# Patient Record
Sex: FEMALE | Race: BLACK OR AFRICAN AMERICAN | NOT HISPANIC OR LATINO | URBAN - METROPOLITAN AREA
[De-identification: names, ages, dates, MRNs, and addresses within clinical notes are randomized per-mention and may not be internally consistent; named-entity substitution may affect disease eponyms.]

---

## 2019-09-20 ENCOUNTER — EMERGENCY (EMERGENCY)
Facility: HOSPITAL | Age: 69
LOS: 1 days | Discharge: LEFT BEFORE DISCHARGE | End: 2019-09-20
Attending: EMERGENCY MEDICINE
Payer: COMMERCIAL

## 2019-09-20 VITALS
OXYGEN SATURATION: 98 % | DIASTOLIC BLOOD PRESSURE: 62 MMHG | HEART RATE: 88 BPM | WEIGHT: 128.09 LBS | SYSTOLIC BLOOD PRESSURE: 116 MMHG | RESPIRATION RATE: 18 BRPM | HEIGHT: 61 IN

## 2019-09-20 PROCEDURE — 93005 ELECTROCARDIOGRAM TRACING: CPT

## 2019-09-20 PROCEDURE — 99284 EMERGENCY DEPT VISIT MOD MDM: CPT

## 2019-09-20 PROCEDURE — 99284 EMERGENCY DEPT VISIT MOD MDM: CPT | Mod: 25

## 2019-09-20 PROCEDURE — 96360 HYDRATION IV INFUSION INIT: CPT

## 2019-09-20 PROCEDURE — 71045 X-RAY EXAM CHEST 1 VIEW: CPT

## 2019-09-20 PROCEDURE — 71045 X-RAY EXAM CHEST 1 VIEW: CPT | Mod: 26

## 2019-09-20 PROCEDURE — 93010 ELECTROCARDIOGRAM REPORT: CPT

## 2019-09-20 RX ORDER — SODIUM CHLORIDE 9 MG/ML
1000 INJECTION INTRAMUSCULAR; INTRAVENOUS; SUBCUTANEOUS ONCE
Refills: 0 | Status: COMPLETED | OUTPATIENT
Start: 2019-09-20 | End: 2019-09-20

## 2019-09-20 RX ADMIN — SODIUM CHLORIDE 1000 MILLILITER(S): 9 INJECTION INTRAMUSCULAR; INTRAVENOUS; SUBCUTANEOUS at 19:00

## 2019-09-20 RX ADMIN — SODIUM CHLORIDE 1000 MILLILITER(S): 9 INJECTION INTRAMUSCULAR; INTRAVENOUS; SUBCUTANEOUS at 18:10

## 2019-09-20 NOTE — ED PROVIDER NOTE - PROGRESS NOTE DETAILS
The patient does not want to stay for further evaluations and wishes to s/o AMA.  The patient understands the risks of s/o AMA including recurrent syncope to death

## 2019-09-20 NOTE — ED ADULT NURSE NOTE - NS ED NURSE ELOPE COMMENTS
pt states to RN "take out my iv I need to go home to my son, I feel better and im not waiting for the bloodwork"

## 2019-09-20 NOTE — ED PROVIDER NOTE - OBJECTIVE STATEMENT
The patient is a 69 year old female [place] with [place] the shopping [place].  The patient felt sick after eating salad and bagel and felt LH, diaphoretic and vomited and passed out.  No CP, No SOB, No abd pain, No back pain, No motor No sensory loss

## 2019-09-20 NOTE — ED ADULT TRIAGE NOTE - CHIEF COMPLAINT QUOTE
patient c/o syncope at [employer]. stated that she vomited. and then passed out. patient denies chest pain,

## 2019-09-20 NOTE — ED ADULT NURSE REASSESSMENT NOTE - NS ED NURSE REASSESS COMMENT FT1
pt requesting to leave ED, states "I have a son I have to get home to", pt refusing labwork, safety maintained

## 2019-09-20 NOTE — ED ADULT NURSE NOTE - OBJECTIVE STATEMENT
pt a&ox3 c/o witnessed syncopal episode at grocery store today, pt did not hit head -bt +nausea and 2x episodes vomiting. Pt states "I ate some potato salad and tuna salad and then I got in the taxi so maybe it was that". NSR on cm, HR 90's. MAEx4. RR even and unlabored. Warm blanket provided, safety maintained, call bell in reach, appears in no apparent distress @ this time

## 2019-09-20 NOTE — ED ADULT NURSE NOTE - NSIMPLEMENTINTERV_GEN_ALL_ED
Implemented All Universal Safety Interventions:  [place] to call system. Call bell, personal items and telephone within reach. Instruct patient to call for assistance. Room bathroom lighting operational. Non-slip footwear when patient is off stretcher. Physically safe environment: no spills, clutter or unnecessary equipment. Stretcher in lowest position, wheels locked, appropriate side rails in place.

## 2019-09-20 NOTE — ED PROVIDER NOTE - PATIENT PORTAL LINK FT
You can access the FollowMyHealth Patient Portal offered by [facility] by registering at the following website: [email or web address]. By joining "[employer]"’s FollowMyHealth portal, you will also be able to view your health information using other applications (apps) compatible with our system.

## 2020-03-16 ENCOUNTER — EMERGENCY (EMERGENCY)
Facility: HOSPITAL | Age: 70
LOS: 1 days | Discharge: HOME | End: 2020-03-16
Attending: EMERGENCY MEDICINE
Payer: COMMERCIAL

## 2020-03-16 VITALS
OXYGEN SATURATION: 100 % | SYSTOLIC BLOOD PRESSURE: 119 MMHG | DIASTOLIC BLOOD PRESSURE: 84 MMHG | TEMPERATURE: 98 F | HEIGHT: 62 IN | WEIGHT: 138.01 LBS | HEART RATE: 96 BPM | RESPIRATION RATE: 16 BRPM

## 2020-03-16 PROBLEM — M19.90 UNSPECIFIED OSTEOARTHRITIS, UNSPECIFIED SITE: Chronic | Status: ACTIVE | Noted: 2019-09-20

## 2020-03-16 PROBLEM — M54.9 DORSALGIA, UNSPECIFIED: Chronic | Status: ACTIVE | Noted: 2019-09-20

## 2020-03-16 LAB
ALBUMIN SERPL ELPH-MCNC: 4.7 G/DL — SIGNIFICANT CHANGE UP (ref 3.3–5.2)
ALP SERPL-CCNC: 170 U/L — HIGH (ref 40–120)
ALT FLD-CCNC: 11 U/L — SIGNIFICANT CHANGE UP
ANION GAP SERPL CALC-SCNC: 15 MMOL/L — SIGNIFICANT CHANGE UP (ref 5–17)
AST SERPL-CCNC: 24 U/L — SIGNIFICANT CHANGE UP
BILIRUB SERPL-MCNC: 0.3 MG/DL — LOW (ref 0.4–2)
BLD GP AB SCN SERPL QL: SIGNIFICANT CHANGE UP
BUN SERPL-MCNC: 24 MG/DL — HIGH (ref 8–20)
CALCIUM SERPL-MCNC: 9.6 MG/DL — SIGNIFICANT CHANGE UP (ref 8.6–10.2)
CHLORIDE SERPL-SCNC: 103 MMOL/L — SIGNIFICANT CHANGE UP (ref 98–107)
CO2 SERPL-SCNC: 24 MMOL/L — SIGNIFICANT CHANGE UP (ref 22–29)
CREAT SERPL-MCNC: 1.05 MG/DL — SIGNIFICANT CHANGE UP (ref 0.5–1.3)
GLUCOSE SERPL-MCNC: 102 MG/DL — HIGH (ref 70–99)
HCT VFR BLD CALC: 37.7 % — SIGNIFICANT CHANGE UP (ref 34.5–45)
HGB BLD-MCNC: 11.9 G/DL — SIGNIFICANT CHANGE UP (ref 11.5–15.5)
MCHC RBC-ENTMCNC: 29.5 PG — SIGNIFICANT CHANGE UP (ref 27–34)
MCHC RBC-ENTMCNC: 31.6 GM/DL — LOW (ref 32–36)
MCV RBC AUTO: 93.5 FL — SIGNIFICANT CHANGE UP (ref 80–100)
PLATELET # BLD AUTO: 372 K/UL — SIGNIFICANT CHANGE UP (ref 150–400)
POTASSIUM SERPL-MCNC: 3.7 MMOL/L — SIGNIFICANT CHANGE UP (ref 3.5–5.3)
POTASSIUM SERPL-SCNC: 3.7 MMOL/L — SIGNIFICANT CHANGE UP (ref 3.5–5.3)
PROT SERPL-MCNC: 7.2 G/DL — SIGNIFICANT CHANGE UP (ref 6.6–8.7)
RBC # BLD: 4.03 M/UL — SIGNIFICANT CHANGE UP (ref 3.8–5.2)
RBC # FLD: 13.6 % — SIGNIFICANT CHANGE UP (ref 10.3–14.5)
SODIUM SERPL-SCNC: 142 MMOL/L — SIGNIFICANT CHANGE UP (ref 135–145)
WBC # BLD: 8.66 K/UL — SIGNIFICANT CHANGE UP (ref 3.8–10.5)
WBC # FLD AUTO: 8.66 K/UL — SIGNIFICANT CHANGE UP (ref 3.8–10.5)

## 2020-03-16 PROCEDURE — 36415 COLL VENOUS BLD VENIPUNCTURE: CPT

## 2020-03-16 PROCEDURE — 80053 COMPREHEN METABOLIC PANEL: CPT

## 2020-03-16 PROCEDURE — 85027 COMPLETE CBC AUTOMATED: CPT

## 2020-03-16 PROCEDURE — 73564 X-RAY EXAM KNEE 4 OR MORE: CPT | Mod: 26,RT

## 2020-03-16 PROCEDURE — 73700 CT LOWER EXTREMITY W/O DYE: CPT | Mod: 26,RT

## 2020-03-16 PROCEDURE — 86901 BLOOD TYPING SEROLOGIC RH(D): CPT

## 2020-03-16 PROCEDURE — 86900 BLOOD TYPING SEROLOGIC ABO: CPT

## 2020-03-16 PROCEDURE — 99285 EMERGENCY DEPT VISIT HI MDM: CPT

## 2020-03-16 PROCEDURE — 86850 RBC ANTIBODY SCREEN: CPT

## 2020-03-16 PROCEDURE — 73564 X-RAY EXAM KNEE 4 OR MORE: CPT

## 2020-03-16 PROCEDURE — 73700 CT LOWER EXTREMITY W/O DYE: CPT

## 2020-03-16 PROCEDURE — 99284 EMERGENCY DEPT VISIT MOD MDM: CPT | Mod: 25

## 2020-03-16 RX ORDER — OXYCODONE AND ACETAMINOPHEN 5; 325 MG/1; MG/1
1 TABLET ORAL ONCE
Refills: 0 | Status: DISCONTINUED | OUTPATIENT
Start: 2020-03-16 | End: 2020-03-16

## 2020-03-16 RX ORDER — RIVAROXABAN 15 MG-20MG
1 KIT ORAL
Qty: 30 | Refills: 0
Start: 2020-03-16 | End: 2020-04-14

## 2020-03-16 RX ADMIN — OXYCODONE AND ACETAMINOPHEN 1 TABLET(S): 5; 325 TABLET ORAL at 14:34

## 2020-03-16 RX ADMIN — OXYCODONE AND ACETAMINOPHEN 1 TABLET(S): 5; 325 TABLET ORAL at 18:10

## 2020-03-16 NOTE — PHYSICAL THERAPY INITIAL EVALUATION ADULT - GENERAL OBSERVATIONS, REHAB EVAL
Pt received seated in bedside w/c, + Knee immobilizer, c/o "ok" pain in right knee, pt agreeable to PT

## 2020-03-16 NOTE — ED ADULT NURSE NOTE - INTERVENTIONS DEFINITIONS
Stretcher in lowest position, wheels locked, appropriate side rails in place/[place] to call system/Call bell, personal items and telephone within reach/Non-slip footwear when patient is off stretcher

## 2020-03-16 NOTE — ED ADULT TRIAGE NOTE - MODE OF ARRIVAL
Patient is a 81y old  Female who presents with a chief complaint of Flu A.      INTERVAL HPI/OVERNIGHT EVENTS: Patient seen and examined at bedside. Patient had fever of 100.4F this morning, received tylenol 650mg PO, repeat temperature 99.2F. Patient states that she is having continued lower back pain, severity 6/10, located at her waistline. Pain is worst with ambulation or movement as well as taking a deep breath. Denies any neurological symptoms including BBI, weakness, numbness, or tingling. Patient states that extra strength tylenol has been helping the pain. Patient endorses some wheezing and nausea, denies any further vomiting. Admits to continued gross hematuria and urinary frequency, denies dysuria, urgency, or incontinence. Denies cough, chest pain, palpitations, abdominal pain, diarrhea.     T(C): 37.3 (20 @ 08:22), Max: 38 (20 @ 07:11)  HR: 76 (20 @ 08:50) (71 - 90)  BP: 164/78 (20 @ 08:22) (129/85 - 164/83)  RR: 17 (20 @ 08:22) (14 - 18)  SpO2: 96% (20 @ 08:50) (96% - 100%)    I&O's Summary    2020 07:01  -  2020 07:00  --------------------------------------------------------  IN: 200 mL / OUT: 0 mL / NET: 200 mL        LABS:                        12.6   4.38  )-----------( 157      ( 2020 06:22 )             40.8         141  |  108  |  12  ----------------------------<  91  4.3   |  27  |  0.85    Ca    7.9<L>      2020 06:22  Phos  1.6         TPro  7.1  /  Alb  3.3  /  TBili  0.5  /  DBili  x   /  AST  20  /  ALT  17  /  AlkPhos  117  01-16    PT/INR - ( 2020 13:04 )   PT: 13.0 sec;   INR: 1.14 ratio         PTT - ( 2020 13:04 )  PTT:26.7 sec  CAPILLARY BLOOD GLUCOSE          Urinalysis Basic - ( 2020 13:04 )    Color: Red / Appearance: Turbid / S.010 / pH: x  Gluc: x / Ketone: Trace  / Bili: Negative / Urobili: Negative   Blood: x / Protein: 100 / Nitrite: Negative   Leuk Esterase: Moderate / RBC: >50 /HPF / WBC 11-25   Sq Epi: x / Non Sq Epi: Occasional / Bacteria: Few        Culture - Urine (collected 2020 21:08)  Source: .Urine Clean Catch (Midstream)  Final Report (2020 19:30):    <10,000 CFU/mL Normal Urogenital Cholé         MEDICATIONS  (STANDING):  albuterol/ipratropium for Nebulization 3 milliLiter(s) Nebulizer every 6 hours  buPROPion . 150 milliGRAM(s) Oral <User Schedule>  buPROPion . 75 milliGRAM(s) Oral at bedtime  calcitonin Nasal 1 Spray(s) Nasal daily  citalopram 40 milliGRAM(s) Oral daily  famotidine    Tablet 40 milliGRAM(s) Oral two times a day  levothyroxine 88 MICROGram(s) Oral daily  lidocaine   Patch 1 Patch Transdermal daily  losartan 25 milliGRAM(s) Oral daily  metoprolol succinate ER 25 milliGRAM(s) Oral daily  oseltamivir 75 milliGRAM(s) Oral two times a day  potassium phosphate / sodium phosphate powder 1 Packet(s) Oral once  simvastatin 40 milliGRAM(s) Oral at bedtime    MEDICATIONS  (PRN):  acetaminophen   Tablet .. 650 milliGRAM(s) Oral every 4 hours PRN Mild Pain (1 - 3)  acetaminophen   Tablet .. 1000 milliGRAM(s) Oral every 6 hours PRN Moderate Pain (4 - 6)  acetaminophen   Tablet .. 650 milliGRAM(s) Oral every 6 hours PRN Temp greater or equal to 38C (100.4F)  ketorolac   Injectable 30 milliGRAM(s) IV Push every 6 hours PRN Severe Pain (7 - 10)      REVIEW OF SYSTEMS:  CONSTITUTIONAL: Admits to fever and fatigue; No weight loss  EYES: No eye pain, visual disturbances, or discharge  ENMT: No difficulty hearing, tinnitus, vertigo; No sinus or throat pain  NECK: No pain   RESPIRATORY: Admits to wheezing; No cough, chills or hemoptysis; No shortness of breath  CARDIOVASCULAR: No chest pain, palpitations, dizziness, or leg swelling  GASTROINTESTINAL: Admits to nausea; No abdominal or epigastric pain. No vomiting, or hematemesis; No diarrhea or constipation; No melena or hematochezia  GENITOURINARY: Admits to gross hematuria and frequency; No dysuria or incontinence  NEUROLOGICAL: No headaches, memory loss, loss of strength, numbness, or tremors  SKIN: No itching, burning, rashes, or lesions   LYMPH NODES: No enlarged glands  MUSCULOSKELETAL: Admits to lower back pain; No joint pain or swelling  PSYCHIATRIC: No depression, anxiety, mood swings, or difficulty sleeping    RADIOLOGY & ADDITIONAL TESTS:    Imaging Personally Reviewed:  [ x ] YES  [ ] NO    Consultant(s) Notes Reviewed:  [ x ] YES  [ ] NO    PHYSICAL EXAM:  GENERAL: Elderly female, sitting comfortably in chair, NAD  HEAD: Atraumatic, Normocephalic  EYES: EOMI, PERRL, conjunctiva and sclera clear  ENMT: No tonsillar erythema, exudates, or enlargement; Moist mucous membranes, Good dentition  NECK: Supple, No JVD  NERVOUS SYSTEM: Alert & Oriented X3, Good concentration; Good motor strength in B/L upper and lower extremities  CHEST/LUNG: Clear to percussion bilaterally; No rales, rhonchi, wheezing, or rubs  HEART: Regular rate and rhythm; No murmurs, rubs, or gallops  ABDOMEN: Soft, Nontender, Nondistended; Bowel sounds present  EXTREMITIES: 2+ Peripheral Pulses, No clubbing, cyanosis, or edema  LYMPH: No lymphadenopathy noted  SKIN: Warm, dry, well-perfused    Care Discussed with Consultants/Other Providers [ ] YES  [ ] NO Patient is a 81y old  Female who presents with a chief complaint of Flu A.      INTERVAL HPI/OVERNIGHT EVENTS: Patient seen and examined at bedside. Patient had fever of 100.4F this morning, received tylenol 650mg PO, repeat temperature 99.2F. Patient states that she is having continued lower back pain, severity 6/10, located at her waistline. Pain is worst with ambulation or movement as well as taking a deep breath. Denies any neurological symptoms including BBI, weakness, numbness, or tingling. Patient states that extra strength tylenol has been helping the pain. Patient endorses some wheezing and nausea, denies any further vomiting. Admits to continued gross hematuria and urinary frequency, denies dysuria, urgency, or incontinence. Denies cough, chest pain, palpitations, abdominal pain, diarrhea.     T(C): 37.3 (20 @ 08:22), Max: 38 (20 @ 07:11)  HR: 76 (20 @ 08:50) (71 - 90)  BP: 164/78 (20 @ 08:22) (129/85 - 164/83)  RR: 17 (20 @ 08:22) (14 - 18)  SpO2: 96% (20 @ 08:50) (96% - 100%)    I&O's Summary    2020 07:01  -  2020 07:00  --------------------------------------------------------  IN: 200 mL / OUT: 0 mL / NET: 200 mL        LABS:                        12.6   4.38  )-----------( 157      ( 2020 06:22 )             40.8         141  |  108  |  12  ----------------------------<  91  4.3   |  27  |  0.85    Ca    7.9<L>      2020 06:22  Phos  1.6         TPro  7.1  /  Alb  3.3  /  TBili  0.5  /  DBili  x   /  AST  20  /  ALT  17  /  AlkPhos  117  01-16    PT/INR - ( 2020 13:04 )   PT: 13.0 sec;   INR: 1.14 ratio         PTT - ( 2020 13:04 )  PTT:26.7 sec  CAPILLARY BLOOD GLUCOSE          Urinalysis Basic - ( 2020 13:04 )    Color: Red / Appearance: Turbid / S.010 / pH: x  Gluc: x / Ketone: Trace  / Bili: Negative / Urobili: Negative   Blood: x / Protein: 100 / Nitrite: Negative   Leuk Esterase: Moderate / RBC: >50 /HPF / WBC 11-25   Sq Epi: x / Non Sq Epi: Occasional / Bacteria: Few        Culture - Urine (collected 2020 21:08)  Source: .Urine Clean Catch (Midstream)  Final Report (2020 19:30):    <10,000 CFU/mL Normal Urogenital Chloé         MEDICATIONS  (STANDING):  albuterol/ipratropium for Nebulization 3 milliLiter(s) Nebulizer every 6 hours  buPROPion . 150 milliGRAM(s) Oral <User Schedule>  buPROPion . 75 milliGRAM(s) Oral at bedtime  calcitonin Nasal 1 Spray(s) Nasal daily  citalopram 40 milliGRAM(s) Oral daily  famotidine    Tablet 40 milliGRAM(s) Oral two times a day  levothyroxine 88 MICROGram(s) Oral daily  lidocaine   Patch 1 Patch Transdermal daily  losartan 25 milliGRAM(s) Oral daily  metoprolol succinate ER 25 milliGRAM(s) Oral daily  oseltamivir 75 milliGRAM(s) Oral two times a day  potassium phosphate / sodium phosphate powder 1 Packet(s) Oral once  simvastatin 40 milliGRAM(s) Oral at bedtime    MEDICATIONS  (PRN):  acetaminophen   Tablet .. 650 milliGRAM(s) Oral every 4 hours PRN Mild Pain (1 - 3)  acetaminophen   Tablet .. 1000 milliGRAM(s) Oral every 6 hours PRN Moderate Pain (4 - 6)  acetaminophen   Tablet .. 650 milliGRAM(s) Oral every 6 hours PRN Temp greater or equal to 38C (100.4F)  ketorolac   Injectable 30 milliGRAM(s) IV Push every 6 hours PRN Severe Pain (7 - 10)      REVIEW OF SYSTEMS:  CONSTITUTIONAL: Admits to fever and fatigue; No weight loss  EYES: No eye pain, visual disturbances, or discharge  ENMT: No difficulty hearing, tinnitus, vertigo; No sinus or throat pain  NECK: No pain   RESPIRATORY: Admits to wheezing; No cough, chills or hemoptysis; No shortness of breath  CARDIOVASCULAR: No chest pain, palpitations, dizziness, or leg swelling  GASTROINTESTINAL: Admits to nausea; No abdominal or epigastric pain. No vomiting, or hematemesis; No diarrhea or constipation; No melena or hematochezia  GENITOURINARY: Admits to gross hematuria and frequency; No dysuria or incontinence  NEUROLOGICAL: No headaches, memory loss, loss of strength, numbness, or tremors  SKIN: No itching, burning, rashes, or lesions   LYMPH NODES: No enlarged glands  MUSCULOSKELETAL: Admits to lower back pain; No joint pain or swelling  PSYCHIATRIC: No depression, anxiety, mood swings, or difficulty sleeping    RADIOLOGY & ADDITIONAL TESTS:    Imaging Personally Reviewed:  [ x ] YES  [ ] NO    Consultant(s) Notes Reviewed:  [ x ] YES  [ ] NO    PHYSICAL EXAM:  GENERAL: Elderly female, sitting comfortably in chair, NAD  HEAD: Atraumatic, Normocephalic  EYES: Conjunctiva and sclera clear  ENMT: Moist mucous membranes  NECK: Supple  NERVOUS SYSTEM: Alert & Oriented X3, 4/5 motor strength B/L LEs; Sensation intact to light touch; Babinski negative B/L  CHEST/LUNG: Mild expiratory wheezing bilaterally; Moving air well throughout all lung fields; No rales, rhonchi, or rubs  HEART: Regular rate and rhythm; No murmurs, rubs, or gallops  ABDOMEN: Soft, Nontender, Nondistended; Bowel sounds present x4  EXTREMITIES: 2+ Peripheral Pulses B/L LEs, No clubbing, cyanosis, or edema  LYMPH: No lymphadenopathy noted  SKIN: Warm, dry, well-perfused    Care Discussed with Consultants/Other Providers [ x ] YES  [ ] NO EMS Ambulance Patient is a 81y old  Female who presents with a chief complaint of Flu A.      INTERVAL HPI/OVERNIGHT EVENTS: Patient seen and examined at bedside. Patient had fever of 100.4F this morning, received tylenol 650mg PO, repeat temperature 99.2F. Patient states that she is having continued lower back pain, severity 6/10, located at her waistline. Pain is worst with ambulation or movement as well as taking a deep breath. Denies any neurological symptoms including BBI, weakness, numbness, or tingling. Patient states that extra strength tylenol has been helping the pain. Patient endorses some wheezing and nausea, denies any further vomiting. Admits to continued gross hematuria and urinary frequency, denies dysuria, urgency, or incontinence. Denies cough, chest pain, palpitations, abdominal pain, diarrhea.     T(C): 37.3 (20 @ 08:22), Max: 38 (20 @ 07:11)  HR: 76 (20 @ 08:50) (71 - 90)  BP: 164/78 (20 @ 08:22) (129/85 - 164/83)  RR: 17 (20 @ 08:22) (14 - 18)  SpO2: 96% (20 @ 08:50) (96% - 100%)    I&O's Summary    2020 07:01  -  2020 07:00  --------------------------------------------------------  IN: 200 mL / OUT: 0 mL / NET: 200 mL        LABS:                        12.6   4.38  )-----------( 157      ( 2020 06:22 )             40.8         141  |  108  |  12  ----------------------------<  91  4.3   |  27  |  0.85    Ca    7.9<L>      2020 06:22  Phos  1.6         TPro  7.1  /  Alb  3.3  /  TBili  0.5  /  DBili  x   /  AST  20  /  ALT  17  /  AlkPhos  117  01-16    PT/INR - ( 2020 13:04 )   PT: 13.0 sec;   INR: 1.14 ratio    PTT - ( 2020 13:04 )  PTT:26.7 sec    Urinalysis Basic - ( 2020 13:04 )  Color: Red / Appearance: Turbid / S.010 / pH: x  Gluc: x / Ketone: Trace  / Bili: Negative / Urobili: Negative   Blood: x / Protein: 100 / Nitrite: Negative   Leuk Esterase: Moderate / RBC: >50 /HPF / WBC 11-25   Sq Epi: x / Non Sq Epi: Occasional / Bacteria: Few    Culture - Urine (collected 2020 21:08)  Source: .Urine Clean Catch (Midstream)  Final Report (2020 19:30):    <10,000 CFU/mL Normal Urogenital Chloé       MEDICATIONS  (STANDING):  albuterol/ipratropium for Nebulization 3 milliLiter(s) Nebulizer every 6 hours  buPROPion . 150 milliGRAM(s) Oral <User Schedule>  buPROPion . 75 milliGRAM(s) Oral at bedtime  calcitonin Nasal 1 Spray(s) Nasal daily  citalopram 40 milliGRAM(s) Oral daily  famotidine    Tablet 40 milliGRAM(s) Oral two times a day  levothyroxine 88 MICROGram(s) Oral daily  lidocaine   Patch 1 Patch Transdermal daily  losartan 25 milliGRAM(s) Oral daily  metoprolol succinate ER 25 milliGRAM(s) Oral daily  oseltamivir 75 milliGRAM(s) Oral two times a day  potassium phosphate / sodium phosphate powder 1 Packet(s) Oral once  simvastatin 40 milliGRAM(s) Oral at bedtime    MEDICATIONS  (PRN):  acetaminophen   Tablet .. 650 milliGRAM(s) Oral every 4 hours PRN Mild Pain (1 - 3)  acetaminophen   Tablet .. 1000 milliGRAM(s) Oral every 6 hours PRN Moderate Pain (4 - 6)  acetaminophen   Tablet .. 650 milliGRAM(s) Oral every 6 hours PRN Temp greater or equal to 38C (100.4F)  ketorolac   Injectable 30 milliGRAM(s) IV Push every 6 hours PRN Severe Pain (7 - 10)      REVIEW OF SYSTEMS:  CONSTITUTIONAL: Admits to fever and fatigue; No weight loss  EYES: No eye pain, visual disturbances, or discharge  ENMT: No difficulty hearing, tinnitus, vertigo; No sinus or throat pain  NECK: No pain   RESPIRATORY: Admits to wheezing; No cough, chills or hemoptysis; No shortness of breath  CARDIOVASCULAR: No chest pain, palpitations, dizziness, or leg swelling  GASTROINTESTINAL: Admits to nausea; No abdominal or epigastric pain. No vomiting, or hematemesis; No diarrhea or constipation; No melena or hematochezia  GENITOURINARY: Admits to gross hematuria and frequency; No dysuria or incontinence  NEUROLOGICAL: No headaches, memory loss, loss of strength, numbness, or tremors  SKIN: No itching, burning, rashes, or lesions   LYMPH NODES: No enlarged glands  MUSCULOSKELETAL: Admits to lower back pain; No joint pain or swelling  PSYCHIATRIC: No depression, anxiety, mood swings, or difficulty sleeping    RADIOLOGY & ADDITIONAL TESTS:    Imaging Personally Reviewed:  [ x ] YES  [ ] NO    Consultant(s) Notes Reviewed:  [ x ] YES  [ ] NO    PHYSICAL EXAM:  GENERAL: Elderly female, sitting comfortably in chair, NAD  HEAD: Atraumatic, Normocephalic  EYES: Conjunctiva and sclera clear  ENMT: Moist mucous membranes  NECK: Supple  NERVOUS SYSTEM: Alert & Oriented X3, 4/5 motor strength B/L LEs; Sensation intact to light touch; Babinski negative B/L  CHEST/LUNG: Mild expiratory wheezing bilaterally; Moving air well throughout all lung fields; No rales, rhonchi, or rubs  HEART: Regular rate and rhythm; No murmurs, rubs, or gallops  ABDOMEN: Soft, Nontender, Nondistended; Bowel sounds present x4  EXTREMITIES: 2+ Peripheral Pulses B/L LEs, No clubbing, cyanosis, or edema  LYMPH: No lymphadenopathy noted  SKIN: Warm, dry, well-perfused    Care Discussed with Consultants/Other Providers [ x ] YES  [ ] NO

## 2020-03-16 NOTE — ED ADULT NURSE NOTE - OBJECTIVE STATEMENT
pt presents c/o slip and fall onto R knee at home last night. increasing pain and swelling to R knee noted since last night.

## 2020-03-16 NOTE — PROVIDER CONTACT NOTE (OTHER) - ACTION/TREATMENT ORDERED:
Pt. expressed her concern that she has no one to pick her up, an ambulance is arranged for her to her house

## 2020-03-16 NOTE — ED PROVIDER NOTE - PATIENT PORTAL LINK FT
You can access the FollowMyHealth Patient Portal offered by [facility] by registering at the following website: [email or web address]. By joining [employer]’s FollowMyHealth portal, you will also be able to view your health information using other applications (apps) compatible with our system.

## 2020-03-16 NOTE — PROVIDER CONTACT NOTE (OTHER) - RECOMMENDATIONS
Pt. is d/c'ed with a 3 in 1 Commode provided at bedside by [facility] and a Wheel chair to be delivered to her house tomorrow morning  by the same.

## 2020-03-16 NOTE — PROVIDER CONTACT NOTE (OTHER) - ASSESSMENT
Pt requires assist for transfers and is only able to perform very short distance ambulation with RW, maintaining NWB on RLE.

## 2020-03-16 NOTE — ED PROVIDER NOTE - PROGRESS NOTE DETAILS
Pt with + tibia plateau fx- Pt seen by PT and Ortho- Pt can not use a cane or rolling walker. Pt needs wheelchair for ADLs in home. Pt also, can not self propel a standard wheelchair and would benefit from a lightweight wheelchair Pt with + tibia plateau fx- Pt seen by PT and Ortho- Ortho ok with dc home in knee immobilizer and dc home with pain control and DVT prevention for 1 month. Dr Nuñez to follow in office. Pt seen by PT- Pt can not use a cane or rolling walker. Pt needs wheelchair for ADLs in home. Pt also, can not self propel a standard wheelchair and would benefit from a lightweight wheelchair

## 2020-03-16 NOTE — CONSULT NOTE ADULT - SUBJECTIVE AND OBJECTIVE BOX
Pt Name: KYMBERLY GELLER    MRN: [id number]      Patient is a 69y Female presenting to the emergency department with a chief complaint of right knee pain s/p slip and fall at home today. Pt says she was cleaning her floor and slipped in the water. Her son helped her up and she had knee pain and swelling and came to the ER. No hip or ankle pain. No numbness or tingling in extremity. No other complaints.    HEALTH ISSUES - PROBLEM Dx:  Right tibial plateau fx      REVIEW OF SYSTEMS    General:	No fevers/chills    Skin/Breast:  No rash  	  Respiratory and Thorax: No CP  	  Cardiovascular:	No SOB    Gastrointestinal:	 No nausea    Musculoskeletal:	 See HPI    Neurological:	See HPI    ROS is otherwise negative.    PAST MEDICAL & SURGICAL HISTORY:  PAST MEDICAL & SURGICAL HISTORY:  Osteoarthritis  Chronic back pain: lumbar spine  S/P laminectomy  S/P  section  S/P hysterectomy: Fibroids  S/P cholecystectomy      Allergies: Lorabid (Hives)  Shellfish (Hives)      Medications:     FAMILY HISTORY:  No pertinent family history in first degree relatives  : non-contributory    Social History:     Ambulation: Walking independently [X ] With Cane [ ] With Walker [ ]  Bedbound [ ]                           11.9   8.66  )-----------( 372      ( 16 Mar 2020 14:46 )             37.7     03-16    142  |  103  |  24.0<H>  ----------------------------<  102<H>  3.7   |  24.0  |  1.05    Ca    9.6      16 Mar 2020 14:46    TPro  7.2  /  Alb  4.7  /  TBili  0.3<L>  /  DBili  x   /  AST  24  /  ALT  11  /  AlkPhos  170<H>  03-16      PHYSICAL EXAM:    Vital Signs Last 24 Hrs  T(C): 36.9 (16 Mar 2020 13:29), Max: 36.9 (16 Mar 2020 13:29)  T(F): 98.4 (16 Mar 2020 13:29), Max: 98.4 (16 Mar 2020 13:29)  HR: 96 (16 Mar 2020 13:29) (96 - 96)  BP: 119/84 (16 Mar 2020 13:29) (119/84 - 119/84)  BP(mean): --  RR: 16 (16 Mar 2020 13:29) (16 - 16)  SpO2: 100% (16 Mar 2020 13:29) (100% - 100%)  Daily Height in cm: 157.48 (16 Mar 2020 13:29)    Daily     Appearance: Alert, responsive, in no acute distress.    Neurological: Sensation is grossly intact to light touch. 5/5 motor function of all extremities. No focal deficits or weaknesses found.    Skin: no rash on visible skin. Skin is clean, dry and intact. No bleeding. No abrasions. No ulcerations.    Vascular: 2+ distal pulses. Cap refill < 2 sec. No extremity ulcerations. No cyanosis.    Musculoskeletal:       Right Lower Extremity: +swelling knee. Skin intact. +TTP med and lat joint line. Able to SLR. Calf soft, NT. +ROM hip. +DF/PF. +EHL/FHL. Sensation intact. DP intact.    Imaging Studies:  < from: CT Knee No Cont, Right (20 @ 15:27) >     EXAM:  CT KNEE ONLY RT                          PROCEDURE DATE:  2020          INTERPRETATION:    CT of the right knee without contrast.    CLINICAL INFORMATION: Right knee pain status post fall.  TECHNIQUE: Axial CT images were obtained of the right knee with coronal and sagittal reconstructions. No contrast was administered.     FINDINGS:    There is a comminuted depressed fracture of the lateral tibial plateau with up to 4 mm depression at the posterior articular surface. Fracture line extends to involve the base of the lateral tibial spine. Nondisplaced fracture involves the medial tibial plateau adjacent to the medial tibial spine. Apparent subtle cortical irregularity in the distal femoral diaphysis seen on sagittal and coronalreformatted images is most likely artifactual, likely related to motion artifact. There is a large knee joint effusion with likely lipohemarthrosis. Scattered vascular calcifications. Mild anterior subcutaneous stranding. No radiopaque foreign bodiesare seen. There is chondrocalcinosis of the menisci and articular cartilage. Arthritic changes in the patellofemoral compartment with cortical irregularities of the patellar articular surface and possible small cystic or erosive changes.    IMPRESSION:    Comminuted depressed fracture of the lateral tibial plateau. Nondisplaced fracture involving the medial tibial plateau adjacent to the medial tibial spine.    Apparent subtle cortical irregularity in the distal femoral diaphysis seen on sagittaland coronal reformatted images is most likely artifactual, likely related to motion artifact. Correlate clinically.    Large knee joint effusion with likely lipohemarthrosis.    Chondrocalcinosis of the menisci. Arthritic changes in the patellofemoral compartment. Correlate clinically for possible CPPD.    < end of copied text >      A/P:  Pt is a  69y Female with right medial and lateral tibial plateau fx    PLAN:   -right knee wrapped with webril and ace  -Knee immobilizer RLE  -NWB RLE  -Pain control  -Elevate RLE  -Evaluated by PT  -Pt would like to go home to take care of her son.  arranging home PT and wheelchair.  -F/U with Dr Nuñez in office this week, call for appointment  D/W Dr Nuñez Pt Name: KYMBERLY GELLER    MRN: [id number]      Patient is a 69y Female presenting to the emergency department with a chief complaint of right knee pain s/p slip and fall at home today. Pt says she was cleaning her floor and slipped in the water. Her son helped her up and she had knee pain and swelling and came to the ER. No hip or ankle pain. No numbness or tingling in extremity. No other complaints.    HEALTH ISSUES - PROBLEM Dx:  Right tibial plateau fx      REVIEW OF SYSTEMS    General:	No fevers/chills    Skin/Breast:  No rash  	  Respiratory and Thorax: No CP  	  Cardiovascular:	No SOB    Gastrointestinal:	 No nausea    Musculoskeletal:	 See HPI    Neurological:	See HPI    ROS is otherwise negative.    PAST MEDICAL & SURGICAL HISTORY:  PAST MEDICAL & SURGICAL HISTORY:  Osteoarthritis  Chronic back pain: lumbar spine  S/P laminectomy  S/P  section  S/P hysterectomy: Fibroids  S/P cholecystectomy      Allergies: Lorabid (Hives)  Shellfish (Hives)      Medications:     FAMILY HISTORY:  No pertinent family history in first degree relatives  : non-contributory    Social History:     Ambulation: Walking independently [X ] With Cane [ ] With Walker [ ]  Bedbound [ ]                           11.9   8.66  )-----------( 372      ( 16 Mar 2020 14:46 )             37.7     03-16    142  |  103  |  24.0<H>  ----------------------------<  102<H>  3.7   |  24.0  |  1.05    Ca    9.6      16 Mar 2020 14:46    TPro  7.2  /  Alb  4.7  /  TBili  0.3<L>  /  DBili  x   /  AST  24  /  ALT  11  /  AlkPhos  170<H>  03-16      PHYSICAL EXAM:    Vital Signs Last 24 Hrs  T(C): 36.9 (16 Mar 2020 13:29), Max: 36.9 (16 Mar 2020 13:29)  T(F): 98.4 (16 Mar 2020 13:29), Max: 98.4 (16 Mar 2020 13:29)  HR: 96 (16 Mar 2020 13:29) (96 - 96)  BP: 119/84 (16 Mar 2020 13:29) (119/84 - 119/84)  BP(mean): --  RR: 16 (16 Mar 2020 13:29) (16 - 16)  SpO2: 100% (16 Mar 2020 13:29) (100% - 100%)  Daily Height in cm: 157.48 (16 Mar 2020 13:29)    Daily     Appearance: Alert, responsive, in no acute distress.    Neurological: Sensation is grossly intact to light touch. 5/5 motor function of all extremities. No focal deficits or weaknesses found.    Skin: no rash on visible skin. Skin is clean, dry and intact. No bleeding. No abrasions. No ulcerations.    Vascular: 2+ distal pulses. Cap refill < 2 sec. No extremity ulcerations. No cyanosis.    Musculoskeletal:       Right Lower Extremity: +swelling knee. Skin intact. +TTP med and lat joint line. Able to SLR. Calf soft, NT. +ROM hip. +DF/PF. +EHL/FHL. Sensation intact. DP intact.    Imaging Studies:  < from: CT Knee No Cont, Right (20 @ 15:27) >     EXAM:  CT KNEE ONLY RT                          PROCEDURE DATE:  2020          INTERPRETATION:    CT of the right knee without contrast.    CLINICAL INFORMATION: Right knee pain status post fall.  TECHNIQUE: Axial CT images were obtained of the right knee with coronal and sagittal reconstructions. No contrast was administered.     FINDINGS:    There is a comminuted depressed fracture of the lateral tibial plateau with up to 4 mm depression at the posterior articular surface. Fracture line extends to involve the base of the lateral tibial spine. Nondisplaced fracture involves the medial tibial plateau adjacent to the medial tibial spine. Apparent subtle cortical irregularity in the distal femoral diaphysis seen on sagittal and coronalreformatted images is most likely artifactual, likely related to motion artifact. There is a large knee joint effusion with likely lipohemarthrosis. Scattered vascular calcifications. Mild anterior subcutaneous stranding. No radiopaque foreign bodiesare seen. There is chondrocalcinosis of the menisci and articular cartilage. Arthritic changes in the patellofemoral compartment with cortical irregularities of the patellar articular surface and possible small cystic or erosive changes.    IMPRESSION:    Comminuted depressed fracture of the lateral tibial plateau. Nondisplaced fracture involving the medial tibial plateau adjacent to the medial tibial spine.    Apparent subtle cortical irregularity in the distal femoral diaphysis seen on sagittaland coronal reformatted images is most likely artifactual, likely related to motion artifact. Correlate clinically.    Large knee joint effusion with likely lipohemarthrosis.    Chondrocalcinosis of the menisci. Arthritic changes in the patellofemoral compartment. Correlate clinically for possible CPPD.    < end of copied text >      A/P:  Pt is a  69y Female with right medial and lateral tibial plateau fx    PLAN:   -right knee wrapped with webril and ace  -Knee immobilizer RLE  -NWB RLE  -Pain control  -DVT prophylaxis x 4 weeks  -Elevate RLE  -Evaluated by PT  -Pt would like to go home to take care of her son.  arranging home PT and wheelchair.  -F/U with Dr Nuñez in office this week, call for appointment  D/W Dr Nuñez Pt Name: KYMBERLY GELLER    MRN: [id number]      Patient is a 69y Female presenting to the emergency department with a chief complaint of right knee pain s/p slip and fall at home today. Pt says she was cleaning her floor and slipped in the water. Her son helped her up and she had knee pain and swelling and came to the ER. No hip or ankle pain. No numbness or tingling in extremity. No other complaints.    HEALTH ISSUES - PROBLEM Dx:  Right tibial plateau fx      REVIEW OF SYSTEMS    General:	No fevers/chills    Skin/Breast:  No rash  	  Respiratory and Thorax: No CP  	  Cardiovascular:	No SOB    Gastrointestinal:	 No nausea    Musculoskeletal:	 See HPI    Neurological:	See HPI    ROS is otherwise negative.    PAST MEDICAL & SURGICAL HISTORY:  PAST MEDICAL & SURGICAL HISTORY:  Osteoarthritis  Chronic back pain: lumbar spine  S/P laminectomy  S/P  section  S/P hysterectomy: Fibroids  S/P cholecystectomy      Allergies: Lorabid (Hives)  Shellfish (Hives)      Medications:     FAMILY HISTORY:  No pertinent family history in first degree relatives  : non-contributory    Social History:     Ambulation: Walking independently [X ] With Cane [ ] With Walker [ ]  Bedbound [ ]                           11.9   8.66  )-----------( 372      ( 16 Mar 2020 14:46 )             37.7     03-16    142  |  103  |  24.0<H>  ----------------------------<  102<H>  3.7   |  24.0  |  1.05    Ca    9.6      16 Mar 2020 14:46    TPro  7.2  /  Alb  4.7  /  TBili  0.3<L>  /  DBili  x   /  AST  24  /  ALT  11  /  AlkPhos  170<H>  03-16      PHYSICAL EXAM:    Vital Signs Last 24 Hrs  T(C): 36.9 (16 Mar 2020 13:29), Max: 36.9 (16 Mar 2020 13:29)  T(F): 98.4 (16 Mar 2020 13:29), Max: 98.4 (16 Mar 2020 13:29)  HR: 96 (16 Mar 2020 13:29) (96 - 96)  BP: 119/84 (16 Mar 2020 13:29) (119/84 - 119/84)  BP(mean): --  RR: 16 (16 Mar 2020 13:29) (16 - 16)  SpO2: 100% (16 Mar 2020 13:29) (100% - 100%)  Daily Height in cm: 157.48 (16 Mar 2020 13:29)    Daily     Appearance: Alert, responsive, in no acute distress.    Neurological: Sensation is grossly intact to light touch. 5/5 motor function of all extremities. No focal deficits or weaknesses found.    Skin: no rash on visible skin. Skin is clean, dry and intact. No bleeding. No abrasions. No ulcerations.    Vascular: 2+ distal pulses. Cap refill < 2 sec. No extremity ulcerations. No cyanosis.    Musculoskeletal:       Right Lower Extremity: +swelling knee. Skin intact. +TTP med and lat joint line. Able to SLR. Calf soft, NT. +ROM hip. +DF/PF. +EHL/FHL. Sensation intact. DP intact.    Imaging Studies:  < from: CT Knee No Cont, Right (20 @ 15:27) >     EXAM:  CT KNEE ONLY RT                          PROCEDURE DATE:  2020          INTERPRETATION:    CT of the right knee without contrast.    CLINICAL INFORMATION: Right knee pain status post fall.  TECHNIQUE: Axial CT images were obtained of the right knee with coronal and sagittal reconstructions. No contrast was administered.     FINDINGS:    There is a comminuted depressed fracture of the lateral tibial plateau with up to 4 mm depression at the posterior articular surface. Fracture line extends to involve the base of the lateral tibial spine. Nondisplaced fracture involves the medial tibial plateau adjacent to the medial tibial spine. Apparent subtle cortical irregularity in the distal femoral diaphysis seen on sagittal and coronalreformatted images is most likely artifactual, likely related to motion artifact. There is a large knee joint effusion with likely lipohemarthrosis. Scattered vascular calcifications. Mild anterior subcutaneous stranding. No radiopaque foreign bodiesare seen. There is chondrocalcinosis of the menisci and articular cartilage. Arthritic changes in the patellofemoral compartment with cortical irregularities of the patellar articular surface and possible small cystic or erosive changes.    IMPRESSION:    Comminuted depressed fracture of the lateral tibial plateau. Nondisplaced fracture involving the medial tibial plateau adjacent to the medial tibial spine.    Apparent subtle cortical irregularity in the distal femoral diaphysis seen on sagittaland coronal reformatted images is most likely artifactual, likely related to motion artifact. Correlate clinically.    Large knee joint effusion with likely lipohemarthrosis.    Chondrocalcinosis of the menisci. Arthritic changes in the patellofemoral compartment. Correlate clinically for possible CPPD.    < end of copied text >      A/P:  Pt is a  69y Female with right medial and lateral tibial plateau fx    PLAN:   -right knee wrapped with webril and ace  -Knee immobilizer RLE  -NWB RLE  -Pain control  -DVT prophylaxis x 4 weeks  -Elevate RLE  -Evaluated by PT  -Pt would like to go home to take care of her son.  arranging home PT and wheelchair.  -F/U with Dr Nuñez/Dr Valdivia in office this week, call for appointment  D/W Dr Nuñez

## 2020-03-16 NOTE — PHYSICAL THERAPY INITIAL EVALUATION ADULT - ADDITIONAL COMMENTS
Pt lives in a private home with her son. (currently home from school 2*2 state mandated closures) 2 steps to enter without handrails, No steps inside. Pt was independent PTA without assist device. Pt does not own DME.

## 2020-03-16 NOTE — CHART NOTE - NSCHARTNOTEFT_GEN_A_CORE
SW Note: SW received call that pt is ready for DC and needs transport home. Pt has new tibia fracture and is now in a knee Immobilizer and non weight bearing on her right side. SW met with pt, who confirmed her address and reported her niece will be home to receive her. Pt has new commode at bedside and will have wheelchair delivered to her home tomorrow. [facility] arranging homecare for pt. No further SW services identified at this time.

## 2020-03-16 NOTE — PROVIDER CONTACT NOTE (OTHER) - SITUATION
PT came to ED s/p fall, diagnosed with right tibial plateau fx.
[facility] met with pt. at bedside in ED, pt. refused to go to a [facility] as recommended. Pt. choses to go home with [facility]. Pt. acknowledged the risks of going home .

## 2020-03-16 NOTE — ED PROVIDER NOTE - OBJECTIVE STATEMENT
68 yo F presented to ED with c/o right knee pain s/p fall onto knee last night. Pt c/o pain, swelling and inability to ambulate. Denies hitting her head. Denies HA, neck pain or LOC

## 2020-03-16 NOTE — ED PROVIDER NOTE - CARE PROVIDER_API CALL
Bill Nuñez)  Orthopaedic Surgery  [address] [facility], [facility] [facility] [address]  [place] [address]  Phone: [phone]  Fax: [phone]  Follow Up Time:

## 2020-03-16 NOTE — ED PROVIDER NOTE - NSFOLLOWUPINSTRUCTIONS_ED_ALL_ED_FT
1. TAKE ALL MEDICATIONS AS DIRECTED.  FOR PAIN YOU CAN TAKE IBUPROFEN (MOTRIN, ADVIL) OR ACETAMINOPHEN (TYLENOL) AS NEEDED, AS DIRECTED ON PACKAGING.  2. FOLLOW UP WITH _Ortho_________ AS DIRECTED.  YOU WERE GIVEN COPIES OF ALL LABS AND IMAGING RESULTS FROM YOUR ER VISIT--PLEASE TAKE THEM WITH YOU TO YOUR APPOINTMENT.  3. IF NEEDED, CALL [phone] TO FIND A PRIMARY CARE PHYSICIAN.  OR CALL [phone] TO MAKE AN APPOINTMENT WITH THE MEDICAL CLINIC.  4. RETURN TO THE ER FOR ANY WORSENING SYMPTOMS.    Fracture    A fracture is a break in one of your bones. This can occur from a variety of injuries, especially traumatic ones. Symptoms include pain, bruising, or swelling. Do not use the injured limb. If a fracture is in one of the bones below your waist, do not put weight on that limb unless instructed to do so by your healthcare provider. Crutches or a cane may have been provided. A splint or cast may have been applied by your health care provider. Make sure to keep it dry and follow up with an orthopedist as instructed.    SEEK IMMEDIATE MEDICAL CARE IF YOU HAVE ANY OF THE FOLLOWING SYMPTOMS: numbness, tingling, increasing pain, or weakness in any part of the injured limb.      KEEP IN KNEE IMMOBILIZER AND FOLLOW UP WITH DR ZHANG (ORTHOPEDICS)  ICE  ELEVATION  PERCOCET EVERY 4-6 HOURS AS NEEDED FOR PAIN

## 2020-03-16 NOTE — PHYSICAL THERAPY INITIAL EVALUATION ADULT - RANGE OF MOTION EXAMINATION, REHAB EVAL
right LE not tested 2*2 Knee immobilizer/bilateral upper extremity ROM was WFL (within functional limits)/Left LE ROM was WFL (within functional limits)

## 2020-03-16 NOTE — ED ADULT TRIAGE NOTE - CHIEF COMPLAINT QUOTE
Trip and fall last night landed on right knee.  Denies any other injuries, denies blood thinners.  A+OX4.

## 2020-03-16 NOTE — PROVIDER CONTACT NOTE (OTHER) - RECOMMENDATIONS
Recommending [facility], Pt not agreeable, wants home. Recommend: W/c as primary mode of transportation, transfers only w assist (Pt's son available to assist) Home PT, RW, shower chair, w/c, bedside commode

## 2020-03-16 NOTE — ED PROVIDER NOTE - ATTENDING CONTRIBUTION TO CARE
Liang: I performed a face to face bedside interview with patient regarding history of present illness, review of symptoms and past medical history. I completed an independent physical exam.  I have discussed patient's plan of care with advanced care provider.   I agree with note as stated above including HISTORY OF PRESENT ILLNESS, HIV, PAST MEDICAL/SURGICAL/FAMILY/SOCIAL HISTORY, ALLERGIES AND HOME MEDICATIONS, REVIEW OF SYSTEMS, PHYSICAL EXAM, MEDICAL DECISION MAKING and any PROGRESS NOTES during the time I functioned as the attending physician for this patient  unless otherwise noted. My brief assessment is as follows: 69F p/w R knee pain s/p fall last night. tender over R patella and proximal tibia. Tibial plateau fracture on ct.     Pt signed out to next attending pending orthopedics consult.     On chart review noted that pt wanted to go home, Was seen by orthopedics and was placed in knee immobilizer, seen by PT and case management(requires wheelchair). Was discharged with ortho follow up and DVT prophylaxis.

## 2020-03-16 NOTE — PHYSICAL THERAPY INITIAL EVALUATION ADULT - CRITERIA FOR SKILLED THERAPEUTIC INTERVENTIONS
PT recommends [facility], Pt not agreeable, wants home. Pt's son available to assist as needed (currently home 2*2 state mandated school closures), w/c as primary mode of transportation, recommend transfers only with assist. Home PT to assist with strengthening and ambulation. Pt will benefit from a shower chair, bedside commode and RW for transfers./anticipated equipment needs at discharge/anticipated discharge recommendation/impairments found

## 2020-05-05 ENCOUNTER — APPOINTMENT (OUTPATIENT)
Dept: ORTHOPEDIC SURGERY | Facility: CLINIC | Age: 70
End: 2020-05-05

## 2020-05-18 ENCOUNTER — APPOINTMENT (OUTPATIENT)
Dept: ORTHOPEDIC SURGERY | Facility: CLINIC | Age: 70
End: 2020-05-18
Payer: COMMERCIAL

## 2020-05-18 VITALS
BODY MASS INDEX: 23 KG/M2 | SYSTOLIC BLOOD PRESSURE: 123 MMHG | WEIGHT: 125 LBS | HEIGHT: 62 IN | HEART RATE: 84 BPM | DIASTOLIC BLOOD PRESSURE: 82 MMHG

## 2020-05-18 DIAGNOSIS — S82.141A DISPLACED BICONDYLAR FRACTURE OF RIGHT TIBIA, INITIAL ENCOUNTER FOR CLOSED FRACTURE: ICD-10-CM

## 2020-05-18 PROCEDURE — 73560 X-RAY EXAM OF KNEE 1 OR 2: CPT | Mod: RT

## 2020-05-18 PROCEDURE — 27530 TREAT KNEE FRACTURE: CPT | Mod: RT

## 2020-05-18 PROCEDURE — 99203 OFFICE O/P NEW LOW 30 MIN: CPT | Mod: 57

## 2020-05-18 RX ORDER — PNV NO.95/FERROUS FUM/FOLIC AC 28MG-0.8MG
TABLET ORAL
Refills: 0 | Status: ACTIVE | COMMUNITY

## 2020-05-18 RX ORDER — FOLIC ACID 20 MG
CAPSULE ORAL
Refills: 0 | Status: ACTIVE | COMMUNITY

## 2020-05-18 NOTE — HISTORY OF PRESENT ILLNESS
[de-identified] : The patient is a 70 year old female being seen for evaluation of her right knee. She reports on March 15th falling on her right knee. She reports the next day going to [facility] and being diagnosed with a fracture of the knee. She reports being put in a knee immobilizer. She is using a wheelchair for transportation today.  She never followed up following the injury.  She reports discomfort while moving the right knee. She reports being afraid to walk on the right knee since the injury. She comes in today for evaluation of the right knee and for treatment options.

## 2020-05-18 NOTE — DISCUSSION/SUMMARY
[de-identified] : The patient is a 70 year old female 9 weeks s/p right lateral tibial plateau fracture of the right knee.  At this point on x-ray it appears that the fracture is healed albeit in a malunited position with depression of the posterior lateral tibial plateau.  At this point it is too far since injury to recommend surgery.  I am going to progress her to weightbearing as tolerated with a hinged knee brace and to work on knee range of motion and strengthening and to wean out of the brace as tolerated.  I'd like to see her back in 6 weeks.

## 2020-05-18 NOTE — PHYSICAL EXAM
[de-identified] : The patient appears well nourished  and in no apparent distress.  The patient is alert and oriented to person, place, and time.   Affect and mood appear normal. The head is normocephalic and atraumatic.  The eyes reveal normal sclera and extra ocular muscles are intact. The tongue is midline with no apparent lesions.  Skin shows normal turgor with no evidence of eczema or psoriasis.  No respiratory distress noted.  Sensation grossly intact.\par   [de-identified] : Exam of the right knee shows full extension, flexion past 90 degrees.  Minimal swelling of the knee.  No swelling of the lower leg.  5/5 motor strength bilaterally distally. Sensation intact distally.  [de-identified] : Xray- 2 views of the right knee shows a healed lateral tibial plateau fracture of the right knee.\par \par Review of the CAT scan and x-rays from the hospital shows a depressed lateral tibial plateau fracture

## 2020-05-18 NOTE — ADDENDUM
[FreeTextEntry1] : This note was authored by Cb Loredo working as a medical scribe for Dr. Bill Nuñez. The note was reviewed, edited, and revised by Dr. Bill Nuñez whom is in agreement with the exam findings, imaging findings, and treatment plan. 05/18/2020.

## 2020-05-21 ENCOUNTER — APPOINTMENT (OUTPATIENT)
Dept: ORTHOPEDIC SURGERY | Facility: CLINIC | Age: 70
End: 2020-05-21

## 2020-07-07 ENCOUNTER — APPOINTMENT (OUTPATIENT)
Dept: ORTHOPEDIC SURGERY | Facility: CLINIC | Age: 70
End: 2020-07-07
